# Patient Record
Sex: FEMALE | Race: WHITE | NOT HISPANIC OR LATINO | URBAN - METROPOLITAN AREA
[De-identification: names, ages, dates, MRNs, and addresses within clinical notes are randomized per-mention and may not be internally consistent; named-entity substitution may affect disease eponyms.]

---

## 2017-08-07 ENCOUNTER — EMERGENCY (EMERGENCY)
Facility: HOSPITAL | Age: 22
LOS: 1 days | Discharge: HOME | End: 2017-08-07
Attending: EMERGENCY MEDICINE | Admitting: EMERGENCY MEDICINE
Payer: COMMERCIAL

## 2017-08-07 VITALS
SYSTOLIC BLOOD PRESSURE: 109 MMHG | RESPIRATION RATE: 16 BRPM | TEMPERATURE: 99 F | DIASTOLIC BLOOD PRESSURE: 68 MMHG | HEART RATE: 67 BPM | OXYGEN SATURATION: 98 %

## 2017-08-07 VITALS
HEART RATE: 73 BPM | DIASTOLIC BLOOD PRESSURE: 75 MMHG | SYSTOLIC BLOOD PRESSURE: 112 MMHG | TEMPERATURE: 98 F | HEIGHT: 64.96 IN | WEIGHT: 209.44 LBS | OXYGEN SATURATION: 98 % | RESPIRATION RATE: 18 BRPM

## 2017-08-07 DIAGNOSIS — R07.89 OTHER CHEST PAIN: ICD-10-CM

## 2017-08-07 DIAGNOSIS — B34.9 VIRAL INFECTION, UNSPECIFIED: ICD-10-CM

## 2017-08-07 LAB
ANION GAP SERPL CALC-SCNC: 17 MMOL/L — SIGNIFICANT CHANGE UP (ref 5–17)
BASOPHILS NFR BLD AUTO: 0.2 % — SIGNIFICANT CHANGE UP (ref 0–2)
BUN SERPL-MCNC: 15 MG/DL — SIGNIFICANT CHANGE UP (ref 7–23)
CALCIUM SERPL-MCNC: 9.8 MG/DL — SIGNIFICANT CHANGE UP (ref 8.4–10.5)
CHLORIDE SERPL-SCNC: 96 MMOL/L — SIGNIFICANT CHANGE UP (ref 96–108)
CO2 SERPL-SCNC: 24 MMOL/L — SIGNIFICANT CHANGE UP (ref 22–31)
CREAT SERPL-MCNC: 0.7 MG/DL — SIGNIFICANT CHANGE UP (ref 0.5–1.3)
D DIMER BLD IA.RAPID-MCNC: <150 NG/ML DDU — SIGNIFICANT CHANGE UP
EOSINOPHIL NFR BLD AUTO: 0.4 % — SIGNIFICANT CHANGE UP (ref 0–6)
GLUCOSE SERPL-MCNC: 91 MG/DL — SIGNIFICANT CHANGE UP (ref 70–99)
HCT VFR BLD CALC: 40.7 % — SIGNIFICANT CHANGE UP (ref 34.5–45)
HGB BLD-MCNC: 14 G/DL — SIGNIFICANT CHANGE UP (ref 11.5–15.5)
LYMPHOCYTES # BLD AUTO: 26.1 % — SIGNIFICANT CHANGE UP (ref 13–44)
MCHC RBC-ENTMCNC: 30.5 PG — SIGNIFICANT CHANGE UP (ref 27–34)
MCHC RBC-ENTMCNC: 34.4 G/DL — SIGNIFICANT CHANGE UP (ref 32–36)
MCV RBC AUTO: 88.7 FL — SIGNIFICANT CHANGE UP (ref 80–100)
MONOCYTES NFR BLD AUTO: 5.9 % — SIGNIFICANT CHANGE UP (ref 2–14)
NEUTROPHILS NFR BLD AUTO: 67.4 % — SIGNIFICANT CHANGE UP (ref 43–77)
PLATELET # BLD AUTO: 326 K/UL — SIGNIFICANT CHANGE UP (ref 150–400)
POTASSIUM SERPL-MCNC: 4 MMOL/L — SIGNIFICANT CHANGE UP (ref 3.5–5.3)
POTASSIUM SERPL-SCNC: 4 MMOL/L — SIGNIFICANT CHANGE UP (ref 3.5–5.3)
RBC # BLD: 4.59 M/UL — SIGNIFICANT CHANGE UP (ref 3.8–5.2)
RBC # FLD: 11.8 % — SIGNIFICANT CHANGE UP (ref 10.3–16.9)
SODIUM SERPL-SCNC: 137 MMOL/L — SIGNIFICANT CHANGE UP (ref 135–145)
WBC # BLD: 9 K/UL — SIGNIFICANT CHANGE UP (ref 3.8–10.5)
WBC # FLD AUTO: 9 K/UL — SIGNIFICANT CHANGE UP (ref 3.8–10.5)

## 2017-08-07 PROCEDURE — 85025 COMPLETE CBC W/AUTO DIFF WBC: CPT

## 2017-08-07 PROCEDURE — 99285 EMERGENCY DEPT VISIT HI MDM: CPT | Mod: 25

## 2017-08-07 PROCEDURE — 80048 BASIC METABOLIC PNL TOTAL CA: CPT

## 2017-08-07 PROCEDURE — 71046 X-RAY EXAM CHEST 2 VIEWS: CPT

## 2017-08-07 PROCEDURE — 71020: CPT | Mod: 26

## 2017-08-07 PROCEDURE — 36415 COLL VENOUS BLD VENIPUNCTURE: CPT

## 2017-08-07 PROCEDURE — 93010 ELECTROCARDIOGRAM REPORT: CPT

## 2017-08-07 PROCEDURE — 93005 ELECTROCARDIOGRAM TRACING: CPT

## 2017-08-07 PROCEDURE — 99284 EMERGENCY DEPT VISIT MOD MDM: CPT | Mod: 25

## 2017-08-07 PROCEDURE — 85379 FIBRIN DEGRADATION QUANT: CPT

## 2017-08-07 RX ORDER — SODIUM CHLORIDE 9 MG/ML
1000 INJECTION INTRAMUSCULAR; INTRAVENOUS; SUBCUTANEOUS ONCE
Qty: 0 | Refills: 0 | Status: COMPLETED | OUTPATIENT
Start: 2017-08-07 | End: 2017-08-07

## 2017-08-07 RX ORDER — IBUPROFEN 200 MG
600 TABLET ORAL ONCE
Qty: 0 | Refills: 0 | Status: COMPLETED | OUTPATIENT
Start: 2017-08-07 | End: 2017-08-07

## 2017-08-07 RX ADMIN — SODIUM CHLORIDE 2000 MILLILITER(S): 9 INJECTION INTRAMUSCULAR; INTRAVENOUS; SUBCUTANEOUS at 21:00

## 2017-08-07 RX ADMIN — Medication 600 MILLIGRAM(S): at 21:00

## 2017-08-07 NOTE — ED PROVIDER NOTE - ATTENDING CONTRIBUTION TO CARE
intermittent chest discomfort, sob.  sharp, sometimes radiates to arms/legs.  went to urgent care and given muscle relaxer which made her nauseous.  recent plane flight from [place].  no other pe risk factors.  exam non focal.  well appearing, lungs cta.  CXR: no focal consolidation, normal bones, normal cardiac contour.  read by Dr. Larkin.  ecg nsr.  wells low risk, d dimer neg.  possible viral etiology, jet lag.  resolved with ibuprofen.  home with similar

## 2017-08-07 NOTE — ED ADULT TRIAGE NOTE - OTHER COMPLAINTS
pt c.o midsternal chest discomfort with intermittent sob on exertion x 1 week. denies pmhx. ekg in progress.

## 2017-08-07 NOTE — ED ADULT NURSE NOTE - OBJECTIVE STATEMENT
Pt presents to ER c/o intermittent sternal CP more left sided x1 wk radiating down bilat arms and legs. Pt also reports a few episodes of vomiting and intermittent SOB on exertion. Pt arrived here from [place] on July 27. No LE swelling, denies CP, abd pain. No sig PMHx. Will monitor and maintain safety.

## 2017-08-07 NOTE — ED PROVIDER NOTE - MEDICAL DECISION MAKING DETAILS
22 yo F with no pmh c/o cp, sob, STONEY, n/v x 3 days with recent travel. Vitals wnl. Exam unremarkable. CXR unremarkable. ddimer neg. Pt feeling better after motrin, fluids. Likely viral syndrome. 20 yo F with no pmh c/o cp, sob, STONEY, n/v x 3 days with recent travel. Vitals wnl. Exam unremarkable. CXR unremarkable. ddimer neg. Pt feeling better after motrin, fluids. Likely viral syndrome. Stable for d/c. Instructed to take motrin as needed. I have discussed the discharge plan with the patient. The patient agrees with the plan, as discussed.  The patient understands Emergency Department diagnosis is a preliminary diagnosis often based on limited information and that the patient must adhere to the follow-up plan as discussed.  The patient understands that if the symptoms worsen or if prescribed medications do not have the desired/planned effect that the patient may return to the Emergency Department at any time for further evaluation and treatment.

## 2017-08-07 NOTE — ED PROVIDER NOTE - OBJECTIVE STATEMENT
20 yo F with no pmh c/o L sternal CP x 1 week. Pain described as aching lasting anywhere from min to hours. Pain radiates to both her arms and legs. Associated with SUAZO x 2 days. Pt reports feeling sob after about 5-10 min of walking. Also reports intermittent palpitations and lightheadedness. Pt reports STONEY for the past few days and n/v x 4 yesterday and the day before. Pt states over the past 2 days she felt a burning in her chest, back and arms which prompted her to come to the ED. Denies fever, chills, cough, abd pain, sore throat, dysuria, LE swelling, OCP use, smoking or FH of CAD. Pt recently came from [place] on 7/27.

## 2017-08-07 NOTE — ED PROVIDER NOTE - PHYSICAL EXAMINATION
Gen: NAD, AAOX3    HEENT: pharynx clear, no exudates  Cardio: RRR, nml s1s2, no murmurs   Lungs: CTA b/l    Abd: soft, nt, nd  Neuro: CN 2-12 intact, normal finger to nose, normal gait    Ext: no swelling or calf tenderness, DP pulse 2+b/l